# Patient Record
Sex: MALE | URBAN - METROPOLITAN AREA
[De-identification: names, ages, dates, MRNs, and addresses within clinical notes are randomized per-mention and may not be internally consistent; named-entity substitution may affect disease eponyms.]

---

## 2023-02-20 ENCOUNTER — OFFICE VISIT (OUTPATIENT)
Dept: URBAN - METROPOLITAN AREA CLINIC 84 | Facility: CLINIC | Age: 68
End: 2023-02-20
Payer: OTHER GOVERNMENT

## 2023-02-20 ENCOUNTER — DASHBOARD ENCOUNTERS (OUTPATIENT)
Age: 68
End: 2023-02-20

## 2023-02-20 ENCOUNTER — TELEPHONE ENCOUNTER (OUTPATIENT)
Dept: URBAN - METROPOLITAN AREA CLINIC 25 | Facility: CLINIC | Age: 68
End: 2023-02-20

## 2023-02-20 VITALS
HEIGHT: 72 IN | BODY MASS INDEX: 24.3 KG/M2 | TEMPERATURE: 97.9 F | HEART RATE: 65 BPM | DIASTOLIC BLOOD PRESSURE: 79 MMHG | WEIGHT: 179.4 LBS | SYSTOLIC BLOOD PRESSURE: 134 MMHG

## 2023-02-20 DIAGNOSIS — D59.31 INFECTION-ASSOCIATED HEMOLYTIC UREMIC SYNDROME: ICD-10-CM

## 2023-02-20 DIAGNOSIS — Z86.010 HISTORY OF COLON POLYPS: ICD-10-CM

## 2023-02-20 PROBLEM — 428283002: Status: ACTIVE | Noted: 2023-02-20

## 2023-02-20 PROCEDURE — 99203 OFFICE O/P NEW LOW 30 MIN: CPT

## 2023-02-20 RX ORDER — GUAIFENESIN 400 MG/1
1 TABLET AS NEEDED TABLET ORAL
Status: ON HOLD | COMMUNITY
Start: 2023-02-15

## 2023-02-20 RX ORDER — BISACODYL 5 MG/1
1 TABLET AS NEEDED TABLET, DELAYED RELEASE ORAL ONCE A DAY
Qty: 30 | Status: ON HOLD | COMMUNITY
Start: 2023-02-15 | End: 2023-03-17

## 2023-02-20 NOTE — HPI-SCREENING
66 yo presents to the clinic for colon cancer screening with [facility], pt has a hx of tubular adenomas, BPH, and HUS (diagnosed last year in January 2022)  and is on long-term abx treatment  No change in bowel habits Increased weight gain back to 180 from 160 over 3 months  No change in appetite No n/v No BRBPR, no melena No abdominal pain No SOB, no CP Last colonoscopy: 03/2019 w/ polyps found  No known family h/o colon cancer/polyps.  History of HUS, has done dialysis in the past, but does not need more dialysis treatment, denies blood thinners, cardiac hardware, and no issues with anesthesia in the past

## 2023-03-07 ENCOUNTER — TELEPHONE ENCOUNTER (OUTPATIENT)
Dept: URBAN - METROPOLITAN AREA CLINIC 25 | Facility: CLINIC | Age: 68
End: 2023-03-07

## 2023-05-25 ENCOUNTER — OFFICE VISIT (OUTPATIENT)
Dept: URBAN - METROPOLITAN AREA MEDICAL CENTER 8 | Facility: MEDICAL CENTER | Age: 68
End: 2023-05-25
Payer: OTHER GOVERNMENT

## 2023-05-25 DIAGNOSIS — Z86.010 ADENOMAS PERSONAL HISTORY OF COLONIC POLYPS: ICD-10-CM

## 2023-05-25 PROCEDURE — 45378 DIAGNOSTIC COLONOSCOPY: CPT | Performed by: INTERNAL MEDICINE
